# Patient Record
Sex: MALE | Race: WHITE
[De-identification: names, ages, dates, MRNs, and addresses within clinical notes are randomized per-mention and may not be internally consistent; named-entity substitution may affect disease eponyms.]

---

## 2022-01-04 ENCOUNTER — HOSPITAL ENCOUNTER (OUTPATIENT)
Dept: HOSPITAL 75 - ENDO | Age: 57
Discharge: HOME | End: 2022-01-04
Attending: SURGERY
Payer: COMMERCIAL

## 2022-01-04 VITALS — BODY MASS INDEX: 24.39 KG/M2 | WEIGHT: 160.94 LBS | HEIGHT: 68.11 IN

## 2022-01-04 VITALS — DIASTOLIC BLOOD PRESSURE: 58 MMHG | SYSTOLIC BLOOD PRESSURE: 99 MMHG

## 2022-01-04 VITALS — DIASTOLIC BLOOD PRESSURE: 56 MMHG | SYSTOLIC BLOOD PRESSURE: 94 MMHG

## 2022-01-04 VITALS — DIASTOLIC BLOOD PRESSURE: 81 MMHG | SYSTOLIC BLOOD PRESSURE: 126 MMHG

## 2022-01-04 VITALS — SYSTOLIC BLOOD PRESSURE: 84 MMHG | DIASTOLIC BLOOD PRESSURE: 56 MMHG

## 2022-01-04 DIAGNOSIS — K21.00: Primary | ICD-10-CM

## 2022-01-04 DIAGNOSIS — Z79.899: ICD-10-CM

## 2022-01-04 DIAGNOSIS — J43.9: ICD-10-CM

## 2022-01-04 DIAGNOSIS — Z86.010: ICD-10-CM

## 2022-01-04 DIAGNOSIS — K44.9: ICD-10-CM

## 2022-01-04 DIAGNOSIS — K57.30: ICD-10-CM

## 2022-01-04 DIAGNOSIS — F17.210: ICD-10-CM

## 2022-01-04 DIAGNOSIS — Z90.89: ICD-10-CM

## 2022-01-04 PROCEDURE — 88305 TISSUE EXAM BY PATHOLOGIST: CPT

## 2022-01-04 NOTE — DISCHARGE INST-SIMPLE/STANDARD
Discharge Inst-Standard


Patient Instructions/Follow Up


Plan of Care/Instructions/FU:  


2 weeks Sosa


Activity as Tolerated:  Yes


Discharge Diet:  Regular Diet











ANGELICA ABRAHAM DO               Jan 4, 2022 12:21

## 2022-01-04 NOTE — PROGRESS NOTE-PRE OPERATIVE
Pre-Operative Progress Note


H&P Reviewed


The H&P was reviewed, patient examined and no changes noted.


Date Seen by Provider:  Jan 4, 2022


Time Seen by Provider:  11:24


Date H&P Reviewed:  Jan 4, 2022


Time H&P Reviewed:  11:24


Pre-Operative Diagnosis:  gerd, hx polyps











ANGELICA ABRAHAM DO               Jan 4, 2022 11:24

## 2022-01-04 NOTE — ANESTHESIA-GENERAL POST-OP
MAC


Patient Condition


Mental Status/LOC:  Same as Preop


Cardiovascular:  Satisfactory


Nausea/Vomiting:  Absent


Respiratory:  Satisfactory


Pain:  Controlled


Complications:  Absent





Post Op Complications


Complications


None





Follow Up Care/Instructions


Patient Instructions


None needed.





Anesthesiology Discharge Order


Discharge Order


Patient is doing well, no complaints, stable vital signs, no apparent adverse 

anesthesia problems.   


No complications reported per nursing.











YAJAIRA ZHAO CRNA           Jan 4, 2022 13:51

## 2022-01-04 NOTE — PROGRESS NOTE-POST OPERATIVE
Post-Operative Progess Note


Surgeon (s)/Assistant (s)


Surgeon


ANGELICA ABRAHAM DO


Assistant:  na





Pre-Operative Diagnosis


gerd, hx polyps





Post-Operative Diagnosis





sliding hiatal hernia, diverticulosis





Procedure & Operative Findings


Date of Procedure


1/4/22


Procedure Performed/Findings


egd c biopsies, colonoscopy


Anesthesia Type


per crna





Estimated Blood Loss


Estimated blood loss (mL):  none





Specimens/Packing


Specimens Removed


antrum, ge











ANGELICA ABRAHAM DO               Jan 4, 2022 12:22

## 2022-01-04 NOTE — OPERATIVE REPORT
DATE OF SERVICE:  01/04/2022



PREOPERATIVE DIAGNOSES:

Gastroesophageal reflux disease, history of polyps.



POSTOPERATIVE DIAGNOSES:

Sliding hiatal hernia, diverticulosis.



PROCEDURE:

EGD with biopsies, colonoscopy.



SURGEON:

Angelica Swan DO



ANESTHESIA:

Per CRNA.



ESTIMATED BLOOD LOSS:

None.



COMPLICATIONS:

None.



SPECIMENS:

Antrum, GE junction.



INDICATIONS:

The patient is a 56-year-old male with GERD symptoms and history of polyps.  He

understands risks and benefits of procedure and wishes to proceed.  Consent was

signed in the chart.



DESCRIPTION OF PROCEDURE:

The patient was taken to the endoscopy suite, placed in left lateral recumbent

position.  Timeout was performed.  Scope was inserted in mouth, down the

esophagus, stomach and into the duodenum without difficulty.  No polyps, masses

or ulcerations.  Scope was slowly retracted back to stomach where it was further

insufflated.  Biopsy of the antrum was obtained.  No polyps, masses or

ulcerations.  Scope was retroflexed noting a sliding hiatal hernia small.  No

other pathology noted.  Scope was returned to its normal position, slowly

withdrawn to distal esophagus.  Biopsy of the GE junction was obtained.  No

polyps, masses or ulcerations.  Scope was slowly retracted back until completely

removed.  Digital rectal exam was performed and palpable polyps, masses or

ulcerations.  Scope was inserted in the rectum and advanced all the way to the

cecum with minimal difficulty.  Prep was adequate.  Scope was slowly retracted

back.  No polyps, masses or ulcerations within the cecum, ascending, transverse,

descending and sigmoid colon.  The sigmoid colon had some diverticulosis

present.  Scope was continuously retracted back into the rectum, where it was

also retroflexed noting no other pathology.  Scope was returned to its normal

position, slowly withdrawn until completely removed.  The patient tolerated

procedure well without any complications.  He was taken to recovery room in

stable condition.



RECOMMENDATIONS:

The patient will need repeat colonoscopy in 5 years due to history of polyps. 

We will follow up on biopsies in a couple of weeks to discuss further management

of his GERD symptoms.





Job ID: [id number]

DocumentID: [id number]

Dictated Date:  01/04/2022 12:24:52

Transcription Date: 01/04/2022 16:47:32

Dictated By: ANGELICA SWAN DO

## 2023-01-19 ENCOUNTER — HOSPITAL ENCOUNTER (OUTPATIENT)
Dept: HOSPITAL 75 - RAD | Age: 58
End: 2023-01-19
Attending: NURSE PRACTITIONER
Payer: COMMERCIAL

## 2023-01-19 DIAGNOSIS — M48.061: ICD-10-CM

## 2023-01-19 DIAGNOSIS — M47.816: ICD-10-CM

## 2023-01-19 DIAGNOSIS — M47.814: Primary | ICD-10-CM

## 2023-01-19 DIAGNOSIS — M43.17: ICD-10-CM

## 2023-01-19 DIAGNOSIS — M47.817: ICD-10-CM

## 2023-01-19 DIAGNOSIS — M48.07: ICD-10-CM

## 2023-01-19 PROCEDURE — 72148 MRI LUMBAR SPINE W/O DYE: CPT

## 2023-01-19 NOTE — DIAGNOSTIC IMAGING REPORT
PROCEDURE: MRI lumbar spine without contrast.



TECHNIQUE: Multiplanar, multisequence MRI of the lumbar spine was

performed without contrast.



INDICATION: Chronic low back pain.



COMPARISON: None.



FINDINGS: Five lumbar-type vertebral bodies are visualized with

the last well-formed disc space designated L5-S1. No acute

fracture or dislocation is seen in the lumbar spine. There is

grade 1-2 anterolisthesis of L5 on S1. Modic type I endplate

degenerative changes are present at the L5-S1 level. Modic type I

endplate degenerative changes are also present at the T11-T12

level. No suspicious focal osseous lesions.



The conus terminates at the L1 level. No masses are seen

associated with the conus or nerve roots of the cauda equina. No

epidural collections are identified.



Multilevel degenerative changes are seen in the lumbar spine with

disc bulges, facet hypertrophy, and buckling of the ligamentum

flavum.



T12-L1: No significant spinal canal or foraminal stenosis. 

L1-L2: No significant spinal canal or foraminal stenosis. 

L2-L3: Broad-based disc bulge and facet hypertrophy result in

mild-to-moderate spinal canal narrowing with no right and mild

left foraminal narrowing. 

L3-L4: Facet hypertrophy and buckling of the ligamentum flavum

result in mild spinal canal narrowing and mild-to-moderate

bilateral foraminal narrowing. 

L4-L5: Broad-based disc bulge, facet hypertrophy, and buckling of

the ligamentum flavum result in mild spinal canal narrowing and

mild-to-moderate bilateral foraminal narrowing. 

L5-S1: Broad-based disc bulge, facet hypertrophy, and buckling of

the ligamentum flavum result in no significant spinal canal

narrowing and moderate-to-severe bilateral foraminal stenosis.  



Paravertebral soft tissues are unremarkable. 



IMPRESSION:

1. No acute fracture or dislocation in the lumbar spine. 

2. Multilevel degenerative changes in the lumbar spine, greatest

at L5-S1 with high-grade bilateral foraminal stenosis.

3. Grade 1-2 anterolisthesis of L5 on S1.

4. Modic type I endplate degenerative changes at the T11-T12 and

L5-S1 levels.



Dictated by: 



  Dictated on workstation # [id number]